# Patient Record
Sex: MALE | Race: WHITE | NOT HISPANIC OR LATINO | Employment: FULL TIME | URBAN - METROPOLITAN AREA
[De-identification: names, ages, dates, MRNs, and addresses within clinical notes are randomized per-mention and may not be internally consistent; named-entity substitution may affect disease eponyms.]

---

## 2023-06-14 ENCOUNTER — OFFICE VISIT (OUTPATIENT)
Dept: URGENT CARE | Facility: CLINIC | Age: 63
End: 2023-06-14
Payer: COMMERCIAL

## 2023-06-14 VITALS
HEIGHT: 68 IN | TEMPERATURE: 99 F | HEART RATE: 71 BPM | DIASTOLIC BLOOD PRESSURE: 74 MMHG | SYSTOLIC BLOOD PRESSURE: 140 MMHG | BODY MASS INDEX: 28.79 KG/M2 | OXYGEN SATURATION: 98 % | WEIGHT: 190 LBS

## 2023-06-14 DIAGNOSIS — K43.9 HERNIA OF ABDOMINAL WALL: Primary | ICD-10-CM

## 2023-06-14 PROCEDURE — 99203 PR OFFICE/OUTPT VISIT, NEW, LEVL III, 30-44 MIN: ICD-10-PCS | Mod: ,,, | Performed by: NURSE PRACTITIONER

## 2023-06-14 PROCEDURE — 99203 OFFICE O/P NEW LOW 30 MIN: CPT | Mod: ,,, | Performed by: NURSE PRACTITIONER

## 2023-06-14 NOTE — PROGRESS NOTES
"Subjective:       Patient ID: Lance Quiñonez is a 63 y.o. male.    Vitals:  height is 5' 8" (1.727 m) and weight is 86.2 kg (190 lb). His oral temperature is 98.5 °F (36.9 °C). His blood pressure is 140/74 (abnormal) and his pulse is 71. His oxygen saturation is 98%.     Chief Complaint: Abdominal Pain (Pain in lower right side since yesterday. )    This is a 63 y.o. male who presents today with a chief complaint of pain in rt lower quadrant since yesterday.     Patient presents with:  Abdominal Pain:  Patient is reports a mild pain to right lower abdomen/upper groin area.  Patient reports that pain started yesterday but improved as he lay rested last night but the pain returned as he removed this morning and began to get his truck to drive to work.  Patient does report a history of small lower abdominal herniation to the right side.  Patient reports that the area has not bothered him in the past but reports that the pain that he is feeling over the past day and a half is located in the area of the herniation.  Patient reports routine bowel movements with last bowel movement this a.m..  Patient denies any change in urination.  Patient denies any injury.    Abdominal Pain  This is a new problem. The current episode started yesterday. The onset quality is gradual. The problem occurs intermittently. The problem has been waxing and waning. The pain is located in the RLQ. The pain is at a severity of 2/10. The patient is experiencing no pain. The abdominal pain does not radiate. Pertinent negatives include no constipation, diarrhea, nausea or vomiting. The pain is aggravated by certain positions. The pain is relieved by Nothing. Treatments tried: milk of magnesia. Prior workup: colonoscopy several months ago.     Constitution: Negative.   Gastrointestinal:  Positive for abdominal pain. Negative for nausea, vomiting, constipation and diarrhea.   Genitourinary: Negative.          Objective:      Physical Exam "   Constitutional: He is oriented to person, place, and time. He appears well-developed.   HENT:   Head: Normocephalic and atraumatic.   Ears:   Right Ear: External ear normal.   Left Ear: External ear normal.   Nose: Nose normal.   Mouth/Throat: Mucous membranes are normal.   Eyes: Conjunctivae and lids are normal.   Neck: Trachea normal. Neck supple.   Cardiovascular: Normal rate, regular rhythm and normal heart sounds.   Pulmonary/Chest: Effort normal and breath sounds normal. No respiratory distress.   Abdominal: Normal appearance and bowel sounds are normal. He exhibits no distension and no mass. Soft. There is no abdominal tenderness. There is no guarding, no tenderness at McBurney's point, negative Strong's sign, no left CVA tenderness and no right CVA tenderness.      Comments: Patient denies any pain to the quadrants of abdomen upon deep palpation.  Patient does report pain increased with palpation to right suprapubic area.  Patient reports that this pain does mildly improved as he lays flat.  Small bulging area noted to this area that is soft   Musculoskeletal: Normal range of motion.         General: Normal range of motion.   Neurological: He is alert and oriented to person, place, and time. He has normal strength.   Skin: Skin is warm, dry, intact, not diaphoretic and not pale.   Psychiatric: His speech is normal and behavior is normal. Judgment and thought content normal.   Nursing note and vitals reviewed.      Past medical history and current medications reviewed.       Assessment:           1. Hernia of abdominal wall              Plan:         Hernia of abdominal wall  -     Ambulatory referral/consult to General Surgery             Patient Instructions   Follow-up with general surgeon for further evaluation.           Medical Decision Making:   Urgent Care Management:  Patient here for evaluation of increasing pain to right suprapubic area at the location of a history of herniation.  Patient was  reports this pain has increased to this area and was told by his PCP that he would need a surgery consult should his pain worsen.  Therefore however patient to general surgery for further evaluation to determine if any further treatment is needed at this time.  Also recommended patient report directly to emergency department for any acute worsening of pain over the next 24-48 hours.         Sky Paulino, GADIEL-C